# Patient Record
Sex: MALE
[De-identification: names, ages, dates, MRNs, and addresses within clinical notes are randomized per-mention and may not be internally consistent; named-entity substitution may affect disease eponyms.]

---

## 2019-10-06 ENCOUNTER — HOSPITAL ENCOUNTER (EMERGENCY)
Dept: HOSPITAL 88 - FSED | Age: 54
Discharge: OTHER INSTITUTION | End: 2019-10-06
Payer: MEDICARE

## 2019-10-06 VITALS — SYSTOLIC BLOOD PRESSURE: 129 MMHG | DIASTOLIC BLOOD PRESSURE: 61 MMHG

## 2019-10-06 VITALS — WEIGHT: 280 LBS | BODY MASS INDEX: 42.44 KG/M2 | HEIGHT: 68 IN

## 2019-10-06 DIAGNOSIS — L02.211: ICD-10-CM

## 2019-10-06 DIAGNOSIS — K43.9: ICD-10-CM

## 2019-10-06 DIAGNOSIS — R10.12: Primary | ICD-10-CM

## 2019-10-06 PROCEDURE — 80076 HEPATIC FUNCTION PANEL: CPT

## 2019-10-06 PROCEDURE — 87040 BLOOD CULTURE FOR BACTERIA: CPT

## 2019-10-06 PROCEDURE — 81003 URINALYSIS AUTO W/O SCOPE: CPT

## 2019-10-06 PROCEDURE — 80048 BASIC METABOLIC PNL TOTAL CA: CPT

## 2019-10-06 PROCEDURE — 74176 CT ABD & PELVIS W/O CONTRAST: CPT

## 2019-10-06 PROCEDURE — 85025 COMPLETE CBC W/AUTO DIFF WBC: CPT

## 2019-10-06 PROCEDURE — 99284 EMERGENCY DEPT VISIT MOD MDM: CPT

## 2019-10-06 NOTE — DIAGNOSTIC IMAGING REPORT
EXAM:  CT of the abdomen and pelvis WITHOUT contrast



HISTORY:  Left-sided pain on the left flank pain

COMPARISON:  None available.



TECHNIQUE: 

The abdomen and pelvis were scanned utilizing a multidetector helical scanner. 

Coronal and sagittal reformats are available.  

            PROTOCOL:  Renal colic

            IV CONTRAST:  None, which limits sensitivity and specificity of

evaluation of the soft tissues and vascular structures.

            ORAL CONTRAST:  None, which limits sensitivity and specificity of

evaluation of the bowel.

            RADIATION DOSE: Total DLP: 814.26 mGy*cm

                      Estimated effective dose:  (DLP x 0.015 x size factor) 

Dose modulation, iterative reconstruction, and/or weight based adjustment of

the mA/kV was utilized to reduce the radiation dose to as low as reasonably

achievable.

            COMPLICATIONS: None



FINDINGS:

LOWER THORAX: 

Bilateral multifocal predominantly peripheral opacities.

Trace nonspecific pericardial fluid.



HEPATOBILIARY:  

No mass.

No biliary dilation.

No calcified gallstone.

SPLEEN: No splenomegaly. Punctate calcified granulomas.

PANCREAS: No focal masses or ductal dilatation.    



ADRENALS:  

*  No right adrenal nodule.

*  The left adrenal gland is not visualized, correlate for history of surgical

removal.



KIDNEYS/URETERS: 

Right:     

*  The right kidney is atrophic with innumerable indeterminate mixed density

lesions and calcifications.

Left:         

*  Status post left nephrectomy.

Transplanted right pelvic kidney:

*  No hydronephrosis or stone.



PELVIC ORGANS/BLADDER: There are no bladder is partially decompressed, which

limits evaluation.



PERITONEUM / RETROPERITONEUM: No free air or fluid.

GI TRACT: On limited evaluation of the gastrointestinal tract, the stomach is

decompressed, which limits evaluation. Scattered descending and sigmoid colon

diverticuli, without evidence of acute diverticulitis. The appendix appears

normal.    



LYMPH NODES: No pathologically enlarged lymph nodes.

VESSELS: Appear unremarkable.

BONES and JOINTS:  No aggressive osseous lesion or acute fracture.

SOFT TISSUES: Diastases of the ventral midline lower abdominal wall with

moderate nonspecific regional soft tissue fat stranding and trace fluid, but no

specific evidence of a discrete loculated drainable fluid collection (series 3

image 162 and sagittal image 83.  The amount of fat stranding partially

obscures evaluation of the underlying soft tissues.



IMPRESSION: 

1.  Lower ventral abdominal wall diastases with regional soft tissue edema, but

no discrete drainable abscess or associated bowel obstruction. A superimposed

ventral midline hernia is possible.

2.  No hydronephrosis or transplant kidney stone. Status post left nephrectomy

with innumerable right renal indeterminate lesions, cysts, and calcifications.



Signed by: Dr. Thanh Woods D.O., M.M.M. on 10/6/2019 12:17 PM

## 2020-05-04 ENCOUNTER — HOSPITAL ENCOUNTER (EMERGENCY)
Dept: HOSPITAL 88 - FSED | Age: 55
Discharge: HOME | End: 2020-05-04
Payer: MEDICARE

## 2020-05-04 VITALS — HEIGHT: 68 IN | WEIGHT: 280 LBS | BODY MASS INDEX: 42.44 KG/M2

## 2020-05-04 DIAGNOSIS — M10.071: ICD-10-CM

## 2020-05-04 DIAGNOSIS — M25.571: Primary | ICD-10-CM

## 2020-05-04 DIAGNOSIS — E11.9: ICD-10-CM

## 2020-05-04 DIAGNOSIS — Z94.0: ICD-10-CM

## 2020-05-04 DIAGNOSIS — I10: ICD-10-CM

## 2020-05-04 PROCEDURE — 99283 EMERGENCY DEPT VISIT LOW MDM: CPT

## 2020-05-04 PROCEDURE — 73600 X-RAY EXAM OF ANKLE: CPT

## 2020-05-04 NOTE — DIAGNOSTIC IMAGING REPORT
EXAMINATION:  ANKLE 2 VIEW RT - [facility]D    



INDICATION: Ankle pain



COMPARISON: None

     

FINDINGS:



No acute fracture or dislocation. Mild diffuse osteopenia. Soft tissues appear

unremarkable. No substantial ankle joint effusion. Mild scattered degenerative

changes.



IMPRESSION: 

No acute osseous injury.



Signed by: Lyle Gutiérrez MD on 5/4/2020 2:41 PM